# Patient Record
Sex: MALE | Race: WHITE | NOT HISPANIC OR LATINO | Employment: FULL TIME | URBAN - METROPOLITAN AREA
[De-identification: names, ages, dates, MRNs, and addresses within clinical notes are randomized per-mention and may not be internally consistent; named-entity substitution may affect disease eponyms.]

---

## 2023-11-06 ENCOUNTER — TELEPHONE (OUTPATIENT)
Dept: PRIMARY CARE | Facility: CLINIC | Age: 58
End: 2023-11-06
Payer: COMMERCIAL

## 2023-11-06 DIAGNOSIS — E78.2 MIXED HYPERLIPIDEMIA: Primary | ICD-10-CM

## 2023-11-06 RX ORDER — ROSUVASTATIN CALCIUM 10 MG/1
10 TABLET, COATED ORAL DAILY
COMMUNITY
End: 2023-11-06 | Stop reason: SDUPTHER

## 2023-11-06 RX ORDER — ROSUVASTATIN CALCIUM 10 MG/1
10 TABLET, COATED ORAL DAILY
Qty: 30 TABLET | Refills: 0 | Status: SHIPPED | OUTPATIENT
Start: 2023-11-06 | End: 2023-11-20 | Stop reason: SDUPTHER

## 2023-11-20 ENCOUNTER — OFFICE VISIT (OUTPATIENT)
Dept: PRIMARY CARE | Facility: CLINIC | Age: 58
End: 2023-11-20
Payer: COMMERCIAL

## 2023-11-20 VITALS
WEIGHT: 200 LBS | SYSTOLIC BLOOD PRESSURE: 130 MMHG | HEIGHT: 70 IN | BODY MASS INDEX: 28.63 KG/M2 | DIASTOLIC BLOOD PRESSURE: 70 MMHG

## 2023-11-20 DIAGNOSIS — E78.2 MIXED HYPERLIPIDEMIA: ICD-10-CM

## 2023-11-20 DIAGNOSIS — J98.8 REVERSIBLE AIRWAYS DISEASE: ICD-10-CM

## 2023-11-20 PROCEDURE — 99213 OFFICE O/P EST LOW 20 MIN: CPT | Performed by: FAMILY MEDICINE

## 2023-11-20 PROCEDURE — 1036F TOBACCO NON-USER: CPT | Performed by: FAMILY MEDICINE

## 2023-11-20 RX ORDER — ALBUTEROL SULFATE 90 UG/1
2 AEROSOL, METERED RESPIRATORY (INHALATION) EVERY 6 HOURS PRN
Qty: 18 G | Refills: 5 | Status: SHIPPED | OUTPATIENT
Start: 2023-11-20

## 2023-11-20 RX ORDER — GLUCOSAM/CHONDRO/HERB 149/HYAL 750-100 MG
1000 TABLET ORAL DAILY
COMMUNITY
Start: 2020-04-27

## 2023-11-20 RX ORDER — ALBUTEROL SULFATE 90 UG/1
2 AEROSOL, METERED RESPIRATORY (INHALATION) EVERY 6 HOURS PRN
COMMUNITY
Start: 2023-05-18 | End: 2023-11-20 | Stop reason: SDUPTHER

## 2023-11-20 RX ORDER — BRIMONIDINE TARTRATE 2 MG/ML
SOLUTION/ DROPS OPHTHALMIC
COMMUNITY
Start: 2023-11-02

## 2023-11-20 RX ORDER — ROSUVASTATIN CALCIUM 10 MG/1
10 TABLET, COATED ORAL DAILY
Qty: 90 TABLET | Refills: 1 | Status: SHIPPED | OUTPATIENT
Start: 2023-11-20

## 2023-11-20 RX ORDER — MAGNESIUM 200 MG
1000 TABLET ORAL 3 TIMES WEEKLY
COMMUNITY
Start: 2020-04-27

## 2023-11-20 RX ORDER — PREDNISOLONE ACETATE 10 MG/ML
SUSPENSION/ DROPS OPHTHALMIC
COMMUNITY
Start: 2023-10-31

## 2023-11-20 RX ORDER — OFLOXACIN 3 MG/ML
SOLUTION/ DROPS OPHTHALMIC
COMMUNITY
Start: 2023-10-31 | End: 2023-11-20 | Stop reason: ALTCHOICE

## 2023-11-20 RX ORDER — DORZOLAMIDE HYDROCHLORIDE AND TIMOLOL MALEATE 20; 5 MG/ML; MG/ML
SOLUTION/ DROPS OPHTHALMIC
COMMUNITY
Start: 2023-11-02

## 2023-11-20 ASSESSMENT — ENCOUNTER SYMPTOMS
COUGH: 0
HEADACHES: 0
WEAKNESS: 0
ABDOMINAL PAIN: 0
CHEST TIGHTNESS: 0
AGITATION: 0
UNEXPECTED WEIGHT CHANGE: 0
SHORTNESS OF BREATH: 0
ARTHRALGIAS: 0

## 2023-11-20 ASSESSMENT — PATIENT HEALTH QUESTIONNAIRE - PHQ9
2. FEELING DOWN, DEPRESSED OR HOPELESS: NOT AT ALL
1. LITTLE INTEREST OR PLEASURE IN DOING THINGS: NOT AT ALL
SUM OF ALL RESPONSES TO PHQ9 QUESTIONS 1 AND 2: 0

## 2023-11-20 NOTE — PROGRESS NOTES
Patient is here today for cholesterol and allergy refill.   He is currently taking crestor 10mg daily and needs this refilled today.    He also uses Albuterol inhaler PRN and wants this refilled today also.    He recently had a detached left retina-  had a surgical repair  and is being followed by ophthalmologist.  HE says he is doing well.   Has another follow up next week with them.    PSA  7/2023  Colonoscopy  11/2022 per patient  Tdap   2019

## 2023-11-20 NOTE — PROGRESS NOTES
Subjective   Patient ID: Joon Amaya is a 58 y.o. male who presents for Hyperlipidemia and Allergic Rhinitis.  HPIPatient is here today for cholesterol and allergy refill.   He is currently taking crestor 10mg daily and needs this refilled today.    He also uses Albuterol inhaler PRN and wants this refilled today also.    He recently had a detached left retina-  had a surgical repair  and is being followed by ophthalmologist.  HE says he is doing well.   Has another follow up next week with them.    PSA  7/2023  Colonoscopy  11/2022 per patient  Tdap   2019  HPI reviewed/agree  Pt generally feels fine  Meds seem ok  no questions   Review of Systems   Constitutional:  Negative for unexpected weight change.   Respiratory:  Negative for cough, chest tightness and shortness of breath.    Cardiovascular:  Negative for chest pain.   Gastrointestinal:  Negative for abdominal pain.   Musculoskeletal:  Negative for arthralgias.   Skin:  Negative for rash.   Neurological:  Negative for weakness and headaches.   Psychiatric/Behavioral:  Negative for agitation.        Objective   Physical Exam  Constitutional:       Appearance: Normal appearance.   HENT:      Head: Normocephalic and atraumatic.      Nose: Nose normal.      Mouth/Throat:      Pharynx: Oropharynx is clear.   Eyes:      Extraocular Movements: Extraocular movements intact.      Pupils: Pupils are equal, round, and reactive to light.   Cardiovascular:      Rate and Rhythm: Normal rate and regular rhythm.      Pulses: Normal pulses.      Heart sounds: Normal heart sounds. No murmur heard.  Pulmonary:      Effort: Pulmonary effort is normal.      Breath sounds: Normal breath sounds.   Abdominal:      General: Abdomen is flat. Bowel sounds are normal.      Palpations: Abdomen is soft.      Tenderness: There is no abdominal tenderness.   Musculoskeletal:         General: Normal range of motion.   Skin:     General: Skin is warm and dry.   Neurological:       General: No focal deficit present.      Mental Status: He is alert and oriented to person, place, and time.   Psychiatric:         Mood and Affect: Mood normal.         Behavior: Behavior normal.         Assessment/Plan   Diagnoses and all orders for this visit:  Mixed hyperlipidemia  -     rosuvastatin (Crestor) 10 mg tablet; Take 1 tablet (10 mg) by mouth once daily.  -     Hepatic Function Panel; Future  -     Lipid Panel; Future  Reversible airways disease  -     ProAir HFA 90 mcg/actuation inhaler; Inhale 2 puffs every 6 hours if needed for wheezing.

## 2023-11-20 NOTE — PROGRESS NOTES
Subjective   Patient ID: Joon Amaya is a 58 y.o. male who presents for Hyperlipidemia and Allergic Rhinitis.  HPI   Patient is here today for cholesterol and allergy refill.   He is currently taking crestor 10mg daily and needs this refilled today.    He also uses Albuterol inhaler PRN and wants this refilled today also.    He recently had a detached left retina-  had a surgical repair  and is being followed by ophthalmologist.  HE says he is doing well.   Has another follow up next week with them.    PSA  7/2023  Colonoscopy  11/2022 per patient  Tdap   2019  HPI reviewed/agree  Pt generally feels fine  Meds seem ok  no questions   Review of Systems   Constitutional:  Negative for unexpected weight change.   Respiratory:  Negative for cough, chest tightness and shortness of breath.    Cardiovascular:  Negative for chest pain.   Gastrointestinal:  Negative for abdominal pain.   Musculoskeletal:  Negative for arthralgias.   Skin:  Negative for rash.   Neurological:  Negative for weakness and headaches.   Psychiatric/Behavioral:  Negative for agitation.        Objective   Physical Exam  Constitutional:       Appearance: Normal appearance.   HENT:      Head: Normocephalic and atraumatic.      Nose: Nose normal.      Mouth/Throat:      Pharynx: Oropharynx is clear.   Eyes:      Extraocular Movements: Extraocular movements intact.      Pupils: Pupils are equal, round, and reactive to light.   Cardiovascular:      Rate and Rhythm: Normal rate and regular rhythm.      Pulses: Normal pulses.      Heart sounds: Normal heart sounds. No murmur heard.  Pulmonary:      Effort: Pulmonary effort is normal.      Breath sounds: Normal breath sounds.   Abdominal:      General: Abdomen is flat. Bowel sounds are normal.      Palpations: Abdomen is soft.      Tenderness: There is no abdominal tenderness.   Musculoskeletal:         General: Normal range of motion.   Skin:     General: Skin is warm and dry.   Neurological:       General: No focal deficit present.      Mental Status: He is alert and oriented to person, place, and time.   Psychiatric:         Mood and Affect: Mood normal.         Behavior: Behavior normal.         Assessment/Plan   Diagnoses and all orders for this visit:  Mixed hyperlipidemia  -     rosuvastatin (Crestor) 10 mg tablet; Take 1 tablet (10 mg) by mouth once daily.  -     Hepatic Function Panel; Future  -     Lipid Panel; Future  Reversible airways disease  -     ProAir HFA 90 mcg/actuation inhaler; Inhale 2 puffs every 6 hours if needed for wheezing.

## 2023-11-21 ENCOUNTER — LAB (OUTPATIENT)
Dept: LAB | Facility: LAB | Age: 58
End: 2023-11-21
Payer: COMMERCIAL

## 2023-11-21 DIAGNOSIS — E78.2 MIXED HYPERLIPIDEMIA: ICD-10-CM

## 2023-11-21 LAB
ALBUMIN SERPL-MCNC: 4.7 G/DL (ref 3.5–5)
ALP BLD-CCNC: 65 U/L (ref 35–125)
ALT SERPL-CCNC: 23 U/L (ref 5–40)
AST SERPL-CCNC: 30 U/L (ref 5–40)
BILIRUB DIRECT SERPL-MCNC: <0.2 MG/DL (ref 0–0.2)
BILIRUB SERPL-MCNC: 1 MG/DL (ref 0.1–1.2)
CHOLEST SERPL-MCNC: 210 MG/DL (ref 133–200)
CHOLEST/HDLC SERPL: 3.5 {RATIO}
HDLC SERPL-MCNC: 60 MG/DL
LDLC SERPL CALC-MCNC: 130 MG/DL (ref 65–130)
PROT SERPL-MCNC: 7.3 G/DL (ref 5.9–7.9)
TRIGL SERPL-MCNC: 98 MG/DL (ref 40–150)

## 2023-11-21 PROCEDURE — 36415 COLL VENOUS BLD VENIPUNCTURE: CPT

## 2023-11-21 PROCEDURE — 80076 HEPATIC FUNCTION PANEL: CPT

## 2023-11-21 PROCEDURE — 80061 LIPID PANEL: CPT

## 2024-06-17 ENCOUNTER — OFFICE VISIT (OUTPATIENT)
Dept: PRIMARY CARE | Facility: CLINIC | Age: 59
End: 2024-06-17
Payer: COMMERCIAL

## 2024-06-17 VITALS
SYSTOLIC BLOOD PRESSURE: 124 MMHG | OXYGEN SATURATION: 98 % | HEIGHT: 69 IN | HEART RATE: 95 BPM | DIASTOLIC BLOOD PRESSURE: 66 MMHG | BODY MASS INDEX: 29.92 KG/M2 | WEIGHT: 202 LBS

## 2024-06-17 DIAGNOSIS — Z00.00 HEALTH MAINTENANCE EXAMINATION: Primary | ICD-10-CM

## 2024-06-17 DIAGNOSIS — E78.2 MIXED HYPERLIPIDEMIA: ICD-10-CM

## 2024-06-17 DIAGNOSIS — J98.8 REVERSIBLE AIRWAYS DISEASE: ICD-10-CM

## 2024-06-17 DIAGNOSIS — Z86.69 HX OF DETACHED RETINA REPAIR: ICD-10-CM

## 2024-06-17 DIAGNOSIS — Z98.890 HX OF DETACHED RETINA REPAIR: ICD-10-CM

## 2024-06-17 DIAGNOSIS — H26.9 CATARACT OF LEFT EYE, UNSPECIFIED CATARACT TYPE: ICD-10-CM

## 2024-06-17 PROCEDURE — 99212 OFFICE O/P EST SF 10 MIN: CPT | Performed by: STUDENT IN AN ORGANIZED HEALTH CARE EDUCATION/TRAINING PROGRAM

## 2024-06-17 PROCEDURE — 1036F TOBACCO NON-USER: CPT | Performed by: STUDENT IN AN ORGANIZED HEALTH CARE EDUCATION/TRAINING PROGRAM

## 2024-06-17 PROCEDURE — 99396 PREV VISIT EST AGE 40-64: CPT | Performed by: STUDENT IN AN ORGANIZED HEALTH CARE EDUCATION/TRAINING PROGRAM

## 2024-06-17 RX ORDER — ROSUVASTATIN CALCIUM 10 MG/1
10 TABLET, COATED ORAL DAILY
Qty: 90 TABLET | Refills: 1 | Status: SHIPPED | OUTPATIENT
Start: 2024-06-17

## 2024-06-17 ASSESSMENT — PATIENT HEALTH QUESTIONNAIRE - PHQ9
2. FEELING DOWN, DEPRESSED OR HOPELESS: NOT AT ALL
SUM OF ALL RESPONSES TO PHQ9 QUESTIONS 1 AND 2: 0
1. LITTLE INTEREST OR PLEASURE IN DOING THINGS: NOT AT ALL

## 2024-06-17 ASSESSMENT — PAIN SCALES - GENERAL: PAINLEVEL: 0-NO PAIN

## 2024-06-17 NOTE — PROGRESS NOTES
"Subjective   Patient ID: Joon Amaya is a 59 y.o. male who presents for Establish Care (Medication refills).    HPI  58 yo female here to establish care  Est Care  HLD  On rosuvastatin 10 mg  On fish oil  3. Hx of detached retina in L eye  Seeing ophtho  4. L cataract  Seeing optho  5. Asthma  Uses proair prn  Uses about once per week  5. HM  Had colonoscopy Nov 2022, 5 year rec  Had pneumonia vaccine, not sure about shingles      Review of Systems  All pertinent positive symptoms are included in the history of present illness.    All other systems have been reviewed and are negative and noncontributory to this patient's current ailments.     Allergies   Allergen Reactions    Bee Venom Protein (Honey Bee) Anaphylaxis    Penicillin Rash    Sulfa (Sulfonamide Antibiotics) Hives and Rash          There is no immunization history on file for this patient.    Objective   Vitals:    06/17/24 1357   BP: 124/66   Pulse: 95   SpO2: 98%   Weight: 91.6 kg (202 lb)   Height: 1.753 m (5' 9\")       Physical Exam  CONSTITUTIONAL - well nourished, well developed, looks like stated age, in no acute distress  SKIN - normal skin color and pigmentation. No rash, lesions, or nodules visualized  HEAD - no trauma, normocephalic  EYES - extraocular muscles are intact  ENT - atraumatic  NECK - no neck mass was observed  LUNGS - CTA B/L  CARDIAC - regular rate and regular rhythm  ABDOMEN - no organomegaly, soft, nontender, nondistended  EXTREMITIES - no edema, no deformities  MSK - moves limbs equally  NEUROLOGICAL - alert, oriented and no focal signs  PSYCHIATRIC - alert, pleasant and cordial, age-appropriate  IMMUNOLOGIC - no cervical lymphadenopathy     Assessment/Plan   58 yo female here to establish care  1. Est Care  2. HLD  Continue rosuvastatin 10 mg  3. Hx of detached retina in L eye  Continue seeing ophtho  4. L cataract  Continue seeing optho  5. Asthma  Continue proair prn  5. HM  Had colonoscopy Nov 2022, 5 year rec  Had " pneumonia vaccine, not sure about shingles  Pt will check shingles vaccine status at home  Lab work

## 2024-06-24 ENCOUNTER — LAB (OUTPATIENT)
Dept: LAB | Facility: LAB | Age: 59
End: 2024-06-24
Payer: COMMERCIAL

## 2024-06-24 DIAGNOSIS — E78.2 MIXED HYPERLIPIDEMIA: ICD-10-CM

## 2024-06-24 DIAGNOSIS — Z00.00 HEALTH MAINTENANCE EXAMINATION: ICD-10-CM

## 2024-06-24 LAB
ALBUMIN SERPL-MCNC: 4.5 G/DL (ref 3.5–5)
ALP BLD-CCNC: 71 U/L (ref 35–125)
ALT SERPL-CCNC: 20 U/L (ref 5–40)
ANION GAP SERPL CALC-SCNC: 12 MMOL/L
AST SERPL-CCNC: 27 U/L (ref 5–40)
BILIRUB SERPL-MCNC: 1.2 MG/DL (ref 0.1–1.2)
BUN SERPL-MCNC: 16 MG/DL (ref 8–25)
CALCIUM SERPL-MCNC: 9.4 MG/DL (ref 8.5–10.4)
CHLORIDE SERPL-SCNC: 105 MMOL/L (ref 97–107)
CHOLEST SERPL-MCNC: 183 MG/DL (ref 133–200)
CHOLEST/HDLC SERPL: 3.6 {RATIO}
CO2 SERPL-SCNC: 24 MMOL/L (ref 24–31)
CREAT SERPL-MCNC: 0.8 MG/DL (ref 0.4–1.6)
EGFRCR SERPLBLD CKD-EPI 2021: >90 ML/MIN/1.73M*2
GLUCOSE SERPL-MCNC: 94 MG/DL (ref 65–99)
HDLC SERPL-MCNC: 51 MG/DL
LDLC SERPL CALC-MCNC: 107 MG/DL (ref 65–130)
POTASSIUM SERPL-SCNC: 3.9 MMOL/L (ref 3.4–5.1)
PROT SERPL-MCNC: 7.1 G/DL (ref 5.9–7.9)
SODIUM SERPL-SCNC: 141 MMOL/L (ref 133–145)
TRIGL SERPL-MCNC: 123 MG/DL (ref 40–150)
TSH SERPL DL<=0.05 MIU/L-ACNC: 3.04 MIU/L (ref 0.27–4.2)

## 2024-06-24 PROCEDURE — 80061 LIPID PANEL: CPT

## 2024-06-24 PROCEDURE — 80053 COMPREHEN METABOLIC PANEL: CPT

## 2024-06-24 PROCEDURE — 84443 ASSAY THYROID STIM HORMONE: CPT

## 2024-06-24 PROCEDURE — 36415 COLL VENOUS BLD VENIPUNCTURE: CPT

## 2024-06-25 ENCOUNTER — TELEPHONE (OUTPATIENT)
Dept: PRIMARY CARE | Facility: CLINIC | Age: 59
End: 2024-06-25
Payer: COMMERCIAL

## 2024-06-25 DIAGNOSIS — E78.2 MIXED HYPERLIPIDEMIA: ICD-10-CM

## 2024-06-25 RX ORDER — ROSUVASTATIN CALCIUM 10 MG/1
10 TABLET, COATED ORAL DAILY
Qty: 90 TABLET | Refills: 1 | Status: SHIPPED | OUTPATIENT
Start: 2024-06-25

## 2024-11-20 ENCOUNTER — TELEPHONE (OUTPATIENT)
Dept: PRIMARY CARE | Facility: CLINIC | Age: 59
End: 2024-11-20
Payer: COMMERCIAL

## 2024-11-20 NOTE — TELEPHONE ENCOUNTER
Tanika Díaz, Can you let Joon know that I would like to see him prior to him going out of town so I can update his labs and refill his medications. Thanks!

## 2024-11-20 NOTE — TELEPHONE ENCOUNTER
Pt's wife (Carmen) called stating pt will be going out of town to [place] from 12/05/24 - April 2025 -She would like to know if pt needs an appointment prior to him leaving to continue getting his medications refilled - Please Advise

## 2024-11-29 ENCOUNTER — OFFICE VISIT (OUTPATIENT)
Dept: PRIMARY CARE | Facility: CLINIC | Age: 59
End: 2024-11-29
Payer: COMMERCIAL

## 2024-11-29 VITALS
OXYGEN SATURATION: 95 % | BODY MASS INDEX: 31.25 KG/M2 | HEIGHT: 69 IN | HEART RATE: 82 BPM | WEIGHT: 211 LBS | SYSTOLIC BLOOD PRESSURE: 135 MMHG | DIASTOLIC BLOOD PRESSURE: 82 MMHG

## 2024-11-29 DIAGNOSIS — E78.2 MIXED HYPERLIPIDEMIA: ICD-10-CM

## 2024-11-29 DIAGNOSIS — J98.8 REVERSIBLE AIRWAYS DISEASE: ICD-10-CM

## 2024-11-29 PROCEDURE — 3008F BODY MASS INDEX DOCD: CPT | Performed by: STUDENT IN AN ORGANIZED HEALTH CARE EDUCATION/TRAINING PROGRAM

## 2024-11-29 PROCEDURE — 1036F TOBACCO NON-USER: CPT | Performed by: STUDENT IN AN ORGANIZED HEALTH CARE EDUCATION/TRAINING PROGRAM

## 2024-11-29 PROCEDURE — 99213 OFFICE O/P EST LOW 20 MIN: CPT | Performed by: STUDENT IN AN ORGANIZED HEALTH CARE EDUCATION/TRAINING PROGRAM

## 2024-11-29 RX ORDER — LEVALBUTEROL TARTRATE 45 UG/1
2 AEROSOL, METERED ORAL EVERY 6 HOURS PRN
Qty: 18 G | Refills: 5 | Status: SHIPPED | OUTPATIENT
Start: 2024-11-29 | End: 2025-11-29

## 2024-11-29 RX ORDER — ROSUVASTATIN CALCIUM 10 MG/1
10 TABLET, COATED ORAL DAILY
Qty: 90 TABLET | Refills: 1 | Status: SHIPPED | OUTPATIENT
Start: 2024-11-29

## 2024-11-29 ASSESSMENT — PAIN SCALES - GENERAL: PAINLEVEL_OUTOF10: 0-NO PAIN

## 2024-11-29 NOTE — PROGRESS NOTES
"Subjective   Patient ID: Joon Amaya is a 59 y.o. male who presents for Med Refill.    HPI  58 yo male here for medication refill  HLD  On crestor  2. Reversible airway disease  On albuterol prn, hasn't needed recently    Review of Systems  All pertinent positive symptoms are included in the history of present illness.    All other systems have been reviewed and are negative and noncontributory to this patient's current ailments.     Allergies   Allergen Reactions    Bee Venom Protein (Honey Bee) Anaphylaxis    Penicillin Rash    Sulfa (Sulfonamide Antibiotics) Hives and Rash          There is no immunization history on file for this patient.    Objective   Vitals:    11/29/24 0832 11/29/24 0947   BP: 140/82 135/82   Pulse: 82    SpO2: 95%    Weight: 95.7 kg (211 lb)    Height: 1.753 m (5' 9\")        Physical Exam  CONSTITUTIONAL - well nourished, well developed, looks like stated age, in no acute distress  SKIN - normal skin color and pigmentation. No rash, lesions, or nodules visualized  HEAD - no trauma, normocephalic  EYES - extraocular muscles are intact  ENT - atraumatic  NECK - no neck mass was observed  LUNGS - CTA B/L  CARDIAC - regular rate and regular rhythm  ABDOMEN - no organomegaly, soft, nontender, nondistended  EXTREMITIES - no edema, no deformities  MSK - moves limbs equally  NEUROLOGICAL - alert, oriented and no focal signs  PSYCHIATRIC - alert, pleasant and cordial, age-appropriate  IMMUNOLOGIC - no cervical lymphadenopathy     Assessment/Plan   58 yo male here for medication refill  HLD  Continue crestor 10 mg  2. Reversible airway disease  Continue rescue inhaler prn    RTC in 6 months for CPE  "

## 2025-07-31 ENCOUNTER — APPOINTMENT (OUTPATIENT)
Dept: PRIMARY CARE | Facility: CLINIC | Age: 60
End: 2025-07-31
Payer: COMMERCIAL

## 2025-07-31 VITALS
SYSTOLIC BLOOD PRESSURE: 150 MMHG | HEART RATE: 74 BPM | OXYGEN SATURATION: 94 % | DIASTOLIC BLOOD PRESSURE: 80 MMHG | HEIGHT: 69 IN | BODY MASS INDEX: 30.51 KG/M2 | WEIGHT: 206 LBS

## 2025-07-31 DIAGNOSIS — R03.0 ELEVATED BLOOD PRESSURE READING IN OFFICE WITHOUT DIAGNOSIS OF HYPERTENSION: ICD-10-CM

## 2025-07-31 DIAGNOSIS — Z12.5 PROSTATE CANCER SCREENING: Primary | ICD-10-CM

## 2025-07-31 DIAGNOSIS — E78.2 MIXED HYPERLIPIDEMIA: ICD-10-CM

## 2025-07-31 DIAGNOSIS — J45.20 MILD INTERMITTENT ASTHMA WITHOUT COMPLICATION (HHS-HCC): ICD-10-CM

## 2025-07-31 PROCEDURE — 1036F TOBACCO NON-USER: CPT | Performed by: STUDENT IN AN ORGANIZED HEALTH CARE EDUCATION/TRAINING PROGRAM

## 2025-07-31 PROCEDURE — 3008F BODY MASS INDEX DOCD: CPT | Performed by: STUDENT IN AN ORGANIZED HEALTH CARE EDUCATION/TRAINING PROGRAM

## 2025-07-31 PROCEDURE — 99214 OFFICE O/P EST MOD 30 MIN: CPT | Performed by: STUDENT IN AN ORGANIZED HEALTH CARE EDUCATION/TRAINING PROGRAM

## 2025-07-31 RX ORDER — FOLIC ACID 0.8 MG
1 TABLET ORAL DAILY
COMMUNITY

## 2025-07-31 RX ORDER — ROSUVASTATIN CALCIUM 10 MG/1
10 TABLET, COATED ORAL DAILY
Qty: 90 TABLET | Refills: 3 | Status: SHIPPED | OUTPATIENT
Start: 2025-07-31

## 2025-07-31 ASSESSMENT — PAIN SCALES - GENERAL: PAINLEVEL_OUTOF10: 0-NO PAIN

## 2025-07-31 ASSESSMENT — ENCOUNTER SYMPTOMS
WOUND: 0
WHEEZING: 0
COUGH: 0
PALPITATIONS: 0
SHORTNESS OF BREATH: 0

## 2025-07-31 ASSESSMENT — PATIENT HEALTH QUESTIONNAIRE - PHQ9
1. LITTLE INTEREST OR PLEASURE IN DOING THINGS: NOT AT ALL
SUM OF ALL RESPONSES TO PHQ9 QUESTIONS 1 AND 2: 0
2. FEELING DOWN, DEPRESSED OR HOPELESS: NOT AT ALL

## 2025-07-31 ASSESSMENT — COLUMBIA-SUICIDE SEVERITY RATING SCALE - C-SSRS: 1. IN THE PAST MONTH, HAVE YOU WISHED YOU WERE DEAD OR WISHED YOU COULD GO TO SLEEP AND NOT WAKE UP?: NO

## 2025-07-31 NOTE — ASSESSMENT & PLAN NOTE
Uses albuterol monthly or less and still has plenty of doses on current inhaler.  No changes at this time.

## 2025-07-31 NOTE — PROGRESS NOTES
"Subjective   Patient ID: Joon Amaya is a 60 y.o. male who presents for New Patient Visit (Patient is in office today to establish care.  /Medication management. ).    Here today to establish care.      Had a severe allergic reaction to a vespid sting many years ago.  Has had stings since then with less severe reaction.     Had a left detached retina, which was fairly severe.  20/200 in left eye WITH glasses.         Review of Systems   Respiratory:  Negative for cough, shortness of breath and wheezing.    Cardiovascular:  Negative for chest pain, palpitations and leg swelling.   Skin:  Negative for pallor, rash and wound.   All other systems reviewed and are negative.      Objective     Visit Vitals  /80   Pulse 74   Ht 1.753 m (5' 9\")   Wt 93.4 kg (206 lb)   SpO2 94%   BMI 30.42 kg/m²   Smoking Status Never   BSA 2.13 m²        BP Readings from Last 5 Encounters:   07/31/25 150/80   11/29/24 135/82   06/17/24 124/66   11/20/23 130/70   05/18/23 136/78        Physical Exam  Constitutional:       Appearance: Normal appearance.   HENT:      Head: Normocephalic and atraumatic.     Cardiovascular:      Rate and Rhythm: Normal rate and regular rhythm.      Pulses: Normal pulses.      Heart sounds: Normal heart sounds.   Pulmonary:      Effort: Pulmonary effort is normal.      Breath sounds: Normal breath sounds.     Skin:     General: Skin is warm and dry.     Neurological:      Mental Status: He is alert.     Psychiatric:         Mood and Affect: Mood normal.         Behavior: Behavior normal.         Assessment & Plan  Mixed hyperlipidemia  Has previously been borderline controlled.  Will update lipid panel and adjust dose if necessary.  Patient also advised that he could take his fish oil twice daily to see more improvement with his cholesterol.   Orders:    rosuvastatin (Crestor) 10 mg tablet; Take 1 tablet (10 mg) by mouth once daily.    Lipid panel; Future    Comprehensive metabolic panel; " Future    Prostate cancer screening    Orders:    PSA; Future    Elevated blood pressure reading in office without diagnosis of hypertension  BP elevated today.  Chart review shows it has never been elevated before.  To monitor.  If persistently high, will treat as essential HTN.       Mild intermittent asthma without complication ([facility])  Uses albuterol monthly or less and still has plenty of doses on current inhaler.  No changes at this time.             Reviewed and approved by ELIZABETH MATOS on 7/31/25 at 8:38 AM.

## 2025-07-31 NOTE — ASSESSMENT & PLAN NOTE
Has previously been borderline controlled.  Will update lipid panel and adjust dose if necessary.  Patient also advised that he could take his fish oil twice daily to see more improvement with his cholesterol.   Orders:    rosuvastatin (Crestor) 10 mg tablet; Take 1 tablet (10 mg) by mouth once daily.    Lipid panel; Future    Comprehensive metabolic panel; Future